# Patient Record
Sex: MALE | Race: BLACK OR AFRICAN AMERICAN
[De-identification: names, ages, dates, MRNs, and addresses within clinical notes are randomized per-mention and may not be internally consistent; named-entity substitution may affect disease eponyms.]

---

## 2018-01-07 ENCOUNTER — HOSPITAL ENCOUNTER (EMERGENCY)
Dept: HOSPITAL 56 - MW.ED | Age: 53
Discharge: HOME | End: 2018-01-07
Payer: COMMERCIAL

## 2018-01-07 VITALS — DIASTOLIC BLOOD PRESSURE: 84 MMHG | SYSTOLIC BLOOD PRESSURE: 123 MMHG

## 2018-01-07 DIAGNOSIS — Z23: ICD-10-CM

## 2018-01-07 DIAGNOSIS — R55: Primary | ICD-10-CM
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
## 2018-01-07 DIAGNOSIS — S00.81XA: ICD-10-CM

## 2018-01-07 LAB
CHLORIDE SERPL-SCNC: 106 MMOL/L (ref 98–110)
SODIUM SERPL-SCNC: 139 MMOL/L (ref 136–146)

## 2018-01-07 PROCEDURE — 80053 COMPREHEN METABOLIC PANEL: CPT

## 2018-01-07 PROCEDURE — 36415 COLL VENOUS BLD VENIPUNCTURE: CPT

## 2018-01-07 PROCEDURE — 85025 COMPLETE CBC W/AUTO DIFF WBC: CPT

## 2018-01-07 PROCEDURE — 90471 IMMUNIZATION ADMIN: CPT

## 2018-01-07 PROCEDURE — 84484 ASSAY OF TROPONIN QUANT: CPT

## 2018-01-07 PROCEDURE — 71045 X-RAY EXAM CHEST 1 VIEW: CPT

## 2018-01-07 PROCEDURE — 96360 HYDRATION IV INFUSION INIT: CPT

## 2018-01-07 PROCEDURE — 70450 CT HEAD/BRAIN W/O DYE: CPT

## 2018-01-07 PROCEDURE — 93005 ELECTROCARDIOGRAM TRACING: CPT

## 2018-01-07 PROCEDURE — 99284 EMERGENCY DEPT VISIT MOD MDM: CPT

## 2018-01-07 PROCEDURE — 90715 TDAP VACCINE 7 YRS/> IM: CPT

## 2018-01-07 NOTE — EDM.PDOC
ED HPI GENERAL MEDICAL PROBLEM





- General


Chief Complaint: Laceration


Stated Complaint: FALL/LACERATION LIP/RT CHEEK


Time Seen by Provider: 01/07/18 13:23


Source of Information: Reports: Patient


History Limitations: Reports: No Limitations





- History of Present Illness


INITIAL COMMENTS - FREE TEXT/NARRATIVE: 





HISTORY AND PHYSICAL:





History of present illness:


Patient is a 52-year-old male who presents to the emergency room after falling 

on Friday afternoon. He states he was ambulating and became dizzy and had a 

syncopal event hitting his face on the gravel ground. He reports afterwards he 

felt "fine" had a mild headache and facial abrasions. He denies any chest pain, 

shortness of breath, abdominal pain, nausea, vomiting or diarrhea.





Review of systems: 


As per history of present illness and below otherwise all systems reviewed and 

negative.





Past medical history: 


As per history of present illness and as reviewed below otherwise 

noncontributory.





Surgical history: 


As per history of present illness and as reviewed below otherwise 

noncontributory.





Social history: 


No reported history of drug or alcohol abuse.





Family history: 


As per history of present illness and as reviewed below otherwise 

noncontributory.





Physical exam:


HEENT: Atraumatic, normocephalic, pupils reactive, negative for conjunctival 

pallor or scleral icterus, mucous membranes moist, throat clear, neck supple, 

nontender, trachea midline.


Lungs: Clear to auscultation, breath sounds equal bilaterally, chest nontender.


Heart: S1S2, regular, negative for clicks, rubs, or JVD.


Abdomen: Soft, nondistended, nontender. Negative for masses or 

hepatosplenomegaly. Negative for costovertebral tenderness.


Pelvis: Stable nontender.


Genitourinary: Deferred.


Rectal: Deferred.


Skin: Abrasion noted to right cheekbone near the temple, approximately the size 

of a quarter. Healing laceration/abrasion noted to right upper lip, does appear 

to have some yellow exudate noted in the center with soft tissue swelling.


Extremities: Atraumatic, negative for cords or calf pain. Neurovascular 

unremarkable.


Neuro: Awake, alert, oriented. Cranial nerves II through XII unremarkable. 

Cerebellum unremarkable. Motor and sensory unremarkable throughout. Exam 

nonfocal.





Patient is agreeable to routine labs and a head CT. He states he feels "fine now

" but does have a mild headache. Has abrasion to his right cheek and bacitracin 

applied. Does have a abrasion to the upper lip which appears macerated with 

yellow exudate. His teeth are intact and denies any of them being loose. No 

laceration or abrasion noted to the tongue. Denies any difficulty breathing.





CBC, CMP, Troponin, EKG, are normal. Normal chest x-ray. Head CT shows no acute 

intracranial abnormality with some soft tissue prominence within the sella. 

Currently staying with eyes closed, breathing easily and vital signs are stable.





Diagnostics:


CBC, CMP, troponin, EKG, head CT, one view chest, orthostatic vital signs





Therapeutics:


IV fluid, bacitracin, tdap





Impression:


Syncopal Event


Fall


Abrasion





Plan:


1. Keflex 500mg, 1 tab twice daily 5 days. Please keep your abrasions clean 

and dry. Tramadol 1 tablet every 4-6 hours as needed for pain. This medication 

can make you drowsy so do not take it when driving or needing to be functioning 

at work. You may take Tylenol and/or ibuprofen during work hours or when 

driving. 2. Ice to the facial area to help with swelling and pain.


3. Follow up with her primary caregiver in the next 1-2 days. Return to the ED 

as needed and as discussed.





Definitive disposition and diagnosis as appropriate pending reevaluation and 

review of above.





Onset Date: 01/05/18


Duration: Day(s):


Location: Reports: Face





- Related Data


 Allergies











Allergy/AdvReac Type Severity Reaction Status Date / Time


 


No Known Allergies Allergy   Verified 01/07/18 13:06











Home Meds: 


 Home Meds





Ibuprofen [Advil] 2 tab PO DAILY 02/03/15 [History]











Past Medical History





- Past Health History


Medical/Surgical History: Denies Medical/Surgical History





Social & Family History





- Family History


Family Medical History: Noncontributory





- Tobacco Use


Smoking Status *Q: Never Smoker


Second Hand Smoke Exposure: No





- Caffeine Use


Caffeine Use: Reports: Coffee





- Alcohol Use


Days Per Week of Alcohol Use: 0





- Recreational Drug Use


Recreational Drug Use: No





ED ROS GENERAL





- Review of Systems


Review Of Systems: ROS reveals no pertinent complaints other than HPI.





ED EXAM, SKIN/RASH


Exam: See Below (See dictation)





Course





- Vital Signs


Last Recorded V/S: 


 Last Vital Signs











Temp  98.5 F   01/07/18 13:06


 


Pulse  115 H  01/07/18 13:06


 


Resp  18   01/07/18 13:06


 


BP  129/78   01/07/18 13:06


 


Pulse Ox  98   01/07/18 13:06














- Orders/Labs/Meds


Orders: 


 Active Orders 24 hr











 Category Date Time Status


 


 Communication Order [RC] STAT Care  01/07/18 13:46 Active


 


 EKG Documentation Completion [RC] STAT Care  01/07/18 13:42 Active


 


 Vaccines to be Administered [RC] PER UNIT ROUTINE Care  01/07/18 13:46 Active


 


 Chest 1V Frontal [CR] Stat Exams  01/07/18 13:42 Taken


 


 Head wo Cont [CT] Stat Exams  01/07/18 13:42 Taken











Labs: 


 Laboratory Tests











  01/07/18 01/07/18 Range/Units





  13:53 13:53 


 


WBC  7.98   (4.0-11.0)  K/uL


 


RBC  4.45 L   (4.50-5.90)  M/uL


 


Hgb  13.7   (13.0-17.0)  g/dL


 


Hct  39.8   (38.0-50.0)  %


 


MCV  89.4   (80.0-98.0)  fL


 


MCH  30.8   (27.0-32.0)  pg


 


MCHC  34.4   (31.0-37.0)  g/dL


 


RDW Std Deviation  39.6   (28.0-62.0)  fl


 


RDW Coeff of Maykel  12   (11.0-15.0)  %


 


Plt Count  225   (150-400)  K/uL


 


MPV  11.60   (7.40-12.00)  fL


 


Neut % (Auto)  75.4   (48.0-80.0)  %


 


Lymph % (Auto)  12.9 L   (16.0-40.0)  %


 


Mono % (Auto)  9.3   (0.0-15.0)  %


 


Eos % (Auto)  2.3   (0.0-7.0)  %


 


Baso % (Auto)  0.1   (0.0-1.5)  %


 


Neut # (Auto)  6.0 H   (1.4-5.7)  K/uL


 


Lymph # (Auto)  1.0   (0.6-2.4)  K/uL


 


Mono # (Auto)  0.7   (0.0-0.8)  K/uL


 


Eos # (Auto)  0.2   (0.0-0.7)  K/uL


 


Baso # (Auto)  0.0   (0.0-0.1)  K/uL


 


Nucleated RBC %  0.0   /100WBC


 


Nucleated RBCs #  0   K/uL


 


Sodium   139  (136-146)  mmol/L


 


Potassium   4.2  (3.5-5.1)  mmol/L


 


Chloride   106  ()  mmol/L


 


Carbon Dioxide   23  (21-31)  mmol/L


 


BUN   10  (6.0-23.0)  mg/dL


 


Creatinine   1.1  (0.6-1.5)  mg/dL


 


Est Cr Clr Drug Dosing   86.22  mL/min


 


Estimated GFR (MDRD)   > 60.0  ml/min


 


Glucose   134 H  ()  mg/dL


 


Calcium   9.3  (8.8-10.8)  mg/dL


 


Total Bilirubin   0.3  (0.1-1.5)  mg/dL


 


AST   18  (5-40)  IU/L


 


ALT   14  (8-54)  IU/L


 


Alkaline Phosphatase   89  ()  


 


Troponin I   < 0.10  (0.0-0.29)  NG/ML


 


Total Protein   7.1  (6.0-8.0)  g/dL


 


Albumin   3.7  (3.5-5.0)  g/dL


 


Globulin   3.4  (2.0-3.5)  g/dL


 


Albumin/Globulin Ratio   1.1 L  (1.3-2.8)  











Meds: 


Medications














Discontinued Medications














Generic Name Dose Route Start Last Admin





  Trade Name Freq  PRN Reason Stop Dose Admin


 


Bacitracin  1 dose  01/07/18 15:13  





  Bacitracin Oint 1 Gm  TOP  01/07/18 15:14  





  ONETIME ONE   


 


Diphtheria/Tetanus/Acell Pertussis  0.5 ml  01/07/18 13:46  01/07/18 14:24





  Adacel  IM  01/07/18 13:47  0.5 ml





  .ONCE ONE   Administration


 


Sodium Chloride  1,000 mls @ 999 mls/hr  01/07/18 13:46  01/07/18 13:55





  Normal Saline  IV  01/07/18 14:46  999 mls/hr





  STAT ONE   Administration


 


Mupirocin  1 gm  01/07/18 13:46  





  Bactroban Oint  TOP  01/07/18 13:47  





  ONETIME ONE   














Departure





- Departure


Time of Disposition: 15:21


Disposition: Home, Self-Care 01


Clinical Impression: 


 Abrasion





Syncope


Qualifiers:


 Syncope type: unspecified Qualified Code(s): R55 - Syncope and collapse





Fall


Qualifiers:


 Encounter type: initial encounter Qualified Code(s): W19.XXXA - Unspecified 

fall, initial encounter








- Discharge Information


Referrals: 


PCP,None [Primary Care Provider] - 


Forms:  ED Department Discharge


Additional Instructions: 


My general discharge


The following information is given to patients seen in the emergency department 

who are being discharged to home. This information is to outline your options 

for follow-up care. We provide all patients seen in our emergency department 

with a follow-up referral.





The need for follow-up, as well as the timing and circumstances, are variable 

depending upon the specifics of your emergency department visit.





If you don't have a primary care physician on staff, we will provide you with a 

referral. We always advise you to contact your personal physician following an 

emergency department visit to inform them of the circumstance of the visit and 

for follow-up with them and/or the need for any referrals to a consulting 

specialist.





The emergency department will also refer you to a specialist when appropriate. 

This referral assures that you have the opportunity for follow-up care with a 

specialist. All of these measure are taken in an effort to provide you with 

optimal care, which includes your follow-up.





Under all circumstances we always encourage you to contact your private 

physician who remains a resource for coordinating your care. When calling for 

follow-up care, please make the office aware that this follow-up is from your 

recent emergency room visit. If for any reason you are refused follow-up, 

please contact the [facility] Emergency 

Department at [phone] and asked to speak to the emergency department 

charge nurse.





[facility]


Primary Care


[address] [place] [id number]


Phone: [phone]


Fax: [phone]





1. Keflex 500mg, 1 tab twice daily 5 days. Please keep your abrasions clean 

and dry. Tramadol 1 tablet every 4-6 hours as needed for pain. This medication 

can make you drowsy so do not take it when driving or needing to be functioning 

at work. You may take Tylenol and/or ibuprofen during work hours or when 

driving. 2. Ice to the facial area to help with swelling and pain.


3. Follow up with her primary caregiver in the next 1-2 days. Return to the ED 

as needed and as discussed.





- My Orders


Last 24 Hours: 


My Active Orders





01/07/18 13:42


EKG Documentation Completion [RC] STAT 


Chest 1V Frontal [CR] Stat 


Head wo Cont [CT] Stat 





01/07/18 13:46


Communication Order [RC] STAT 


Vaccines to be Administered [RC] PER UNIT ROUTINE 














- Assessment/Plan


Last 24 Hours: 


My Active Orders





01/07/18 13:42


EKG Documentation Completion [RC] STAT 


Chest 1V Frontal [CR] Stat 


Head wo Cont [CT] Stat 





01/07/18 13:46


Communication Order [RC] STAT 


Vaccines to be Administered [RC] PER UNIT ROUTINE

## 2018-01-08 NOTE — CT
EXAM DATE: 18



PATIENT'S AGE: 52





Patient: KRZYSZTOF PRADO



Facility: [place], [place]

Patient ID: [id number]

Site Patient ID: [id number].

Site Accession #: [id number]

: 1965

Study: CT Head [id number]/[id number] 2:15:02 PM

Ordering Physician: Dion Patiño



Final Report: 

Indication:

Fall, dizziness.



Comparison:

None.



Technique:

Axial CT of the head without contrast.



Findings:

Normal brain parenchymal morphology. No acute intracranial hemorrhage, acute 
infarct, mass effect, or fracture. No midline shift. No abnormal ventricular 
dilatation. Normal calvarium and skull base. Visualized mastoid air cells are 
clear. Mucosal thickening within the visualized left maxillary sinus. Mucosal 
thickening within scattered ethmoid air cells. There is soft tissue prominence 
within the sella and discontinuity along the right aspect of the tuberculum 
sella (coronal image 34). Correlate for any history of transsphenoidal surgery. 
No the liver evidence of a suprasellar mass. No impingement of the optic 
chiasm. 



Impression:

1. No acute intracranial abnormality.

2. Normal brain parenchymal morphology and signal intensity.

3. Soft tissue prominence within the sella with small focal defect of the right 
tuberculum sella. Findings may be due to postoperative change. Consider 
interval followup with MRI of the sella without and with IV gadolinium for 
further evaluation.



Please note that all CT scans at this facility use dose modulation, iterative 
reconstruction, and/or weight-based dosing when appropriate to reduce radiation 
dose to as low as reasonably achievable.



Dictated by Marv Guerra MD @ 2018 2:16PM

(Electronic Signature)



Report Signed by Proxy.
MTDD

## 2018-01-08 NOTE — CR
EXAM DATE: 18



PATIENT'S AGE: 52





Patient: KRZYSZTOF PRADO



Facility: [place], [place]

Patient ID: [id number]

Site Patient ID: [id number].

Site Accession #: [id number]

: 1965

Study: XRay Chest [id number]/7/2018 2:17:23 PM

Ordering Physician: Dion Patiño



Final Report: 

INDICATION:

Trauma. Fall. Dizziness.



TECHNIQUE:

PA chest.



FINDINGS:

Clear lungs. Normal heart size and pulmonary vascularity. Normal included 
skeletal thorax.



IMPRESSION:

Negative single-view chest xray.



Dictated by Alan Oneill MD @ 2018 2:21:45 PM





Dictated by: Alan Oneill MD @ 2018 14:21:58

(Electronic Signature)



Report Signed by Proxy.
TMAARA